# Patient Record
Sex: MALE | Race: WHITE | URBAN - NONMETROPOLITAN AREA
[De-identification: names, ages, dates, MRNs, and addresses within clinical notes are randomized per-mention and may not be internally consistent; named-entity substitution may affect disease eponyms.]

---

## 2020-12-04 ENCOUNTER — TELEPHONE ENCOUNTER (OUTPATIENT)
Dept: URBAN - NONMETROPOLITAN AREA CLINIC 4 | Facility: CLINIC | Age: 60
End: 2020-12-04

## 2020-12-04 RX ORDER — DICYCLOMINE HYDROCHLORIDE 20 MG/1
TAKE 1 TABLET BY MOUTH THREE TIMES DAILY AS NEEDED FOR 30 DAYS TABLET ORAL THREE TIMES A DAY
Qty: 90 | Refills: 2 | OUTPATIENT
Start: 2020-12-04 | End: 2021-08-31

## 2020-12-04 RX ORDER — ONDANSETRON HYDROCHLORIDE 4 MG/1
1 TABLET TABLET, FILM COATED ORAL ONCE A DAY
Qty: 30 | OUTPATIENT
Start: 2020-12-04

## 2020-12-07 ENCOUNTER — WEB ENCOUNTER (OUTPATIENT)
Dept: URBAN - METROPOLITAN AREA CLINIC 54 | Facility: CLINIC | Age: 60
End: 2020-12-07

## 2020-12-07 ENCOUNTER — LAB OUTSIDE AN ENCOUNTER (OUTPATIENT)
Dept: URBAN - METROPOLITAN AREA CLINIC 54 | Facility: CLINIC | Age: 60
End: 2020-12-07

## 2020-12-07 ENCOUNTER — OFFICE VISIT (OUTPATIENT)
Dept: URBAN - METROPOLITAN AREA CLINIC 54 | Facility: CLINIC | Age: 60
End: 2020-12-07
Payer: COMMERCIAL

## 2020-12-07 DIAGNOSIS — R19.4 CHANGE IN BOWEL HABITS: ICD-10-CM

## 2020-12-07 DIAGNOSIS — R10.30 LOWER ABDOMINAL PAIN: ICD-10-CM

## 2020-12-07 DIAGNOSIS — K57.92 DIVERTICULITIS: ICD-10-CM

## 2020-12-07 PROCEDURE — 99204 OFFICE O/P NEW MOD 45 MIN: CPT | Performed by: INTERNAL MEDICINE

## 2020-12-07 PROCEDURE — G8483 FLU IMM NO ADMIN DOC REA: HCPCS | Performed by: INTERNAL MEDICINE

## 2020-12-07 RX ORDER — METRONIDAZOLE 500 MG/1
1 TABLET TABLET ORAL
Qty: 30 | OUTPATIENT
Start: 2020-12-07 | End: 2020-12-17

## 2020-12-07 RX ORDER — ONDANSETRON HYDROCHLORIDE 4 MG/1
1 TABLET TABLET, FILM COATED ORAL ONCE A DAY
Qty: 30 | Status: DISCONTINUED | COMMUNITY
Start: 2020-12-04

## 2020-12-07 RX ORDER — DICYCLOMINE HYDROCHLORIDE 20 MG/1
TAKE 1 TABLET BY MOUTH THREE TIMES DAILY AS NEEDED FOR 30 DAYS TABLET ORAL THREE TIMES A DAY
Qty: 90 | Refills: 2 | Status: DISCONTINUED | COMMUNITY
Start: 2020-12-04 | End: 2021-08-31

## 2020-12-07 RX ORDER — NAPROXEN SODIUM 220 MG/1
1 TABLET WITH FOOD OR MILK AS NEEDED TABLET ORAL
Status: ACTIVE | COMMUNITY

## 2020-12-07 RX ORDER — CIPROFLOXACIN HYDROCHLORIDE 500 MG/1
1 TABLET TABLET, FILM COATED ORAL TWICE A DAY
Qty: 20 | OUTPATIENT
Start: 2020-12-07 | End: 2020-12-17

## 2020-12-07 NOTE — HPI-TODAY'S VISIT:
Having lower abd pain. No stool x 4 days.  Pt reports having lower abd pain but has gotten more intense. Pt reports that he had to go home from work on Thurs. Pt was able to keep the pain under control.  Pt has never had a dx of diverticuliti. Pt with lower left abd pain. Pain was intermittent and varying in intensity.

## 2020-12-09 ENCOUNTER — TELEPHONE ENCOUNTER (OUTPATIENT)
Dept: URBAN - METROPOLITAN AREA CLINIC 54 | Facility: CLINIC | Age: 60
End: 2020-12-09

## 2020-12-09 ENCOUNTER — TELEPHONE ENCOUNTER (OUTPATIENT)
Dept: URBAN - METROPOLITAN AREA CLINIC 92 | Facility: CLINIC | Age: 60
End: 2020-12-09

## 2020-12-10 ENCOUNTER — OUT OF OFFICE VISIT (OUTPATIENT)
Dept: URBAN - METROPOLITAN AREA MEDICAL CENTER 23 | Facility: MEDICAL CENTER | Age: 60
End: 2020-12-10
Payer: COMMERCIAL

## 2020-12-10 DIAGNOSIS — R53.83 DECREASED ENERGY: ICD-10-CM

## 2020-12-10 DIAGNOSIS — R10.84 ABDOMINAL CRAMPING, GENERALIZED: ICD-10-CM

## 2020-12-10 DIAGNOSIS — K57.20 ABSCESS OF SIGMOID COLON DUE TO DIVERTICULITIS: ICD-10-CM

## 2020-12-10 LAB
A/G RATIO: 0.8
ALBUMIN: 3.3
ALKALINE PHOSPHATASE: 137
ALT (SGPT): 30
APPEARANCE: (no result)
AST (SGOT): 30
BACTERIA: (no result)
BASO (ABSOLUTE): 0.1
BASOS: 0
BILIRUBIN, TOTAL: 0.5
BILIRUBIN: NEGATIVE
BUN/CREATININE RATIO: 8
BUN: 9
CALCIUM: 9.4
CARBON DIOXIDE, TOTAL: 25
CAST TYPE: (no result)
CASTS: PRESENT
CHLORIDE: 93
COMMENT: (no result)
CREATININE: 1.15
CRYSTAL TYPE: (no result)
CRYSTALS: (no result)
EGFR IF AFRICN AM: 80
EGFR IF NONAFRICN AM: 69
EOS (ABSOLUTE): 0
EOS: 0
EPITHELIAL CELLS (NON RENAL): (no result)
EPITHELIAL CELLS (RENAL): (no result)
GLOBULIN, TOTAL: 4.1
GLUCOSE: (no result)
GLUCOSE: 133
HEMATOCRIT: 52.3
HEMATOLOGY COMMENTS:: (no result)
HEMOGLOBIN: 18.7
IMMATURE CELLS: (no result)
IMMATURE GRANS (ABS): 0.4
IMMATURE GRANULOCYTES: 2
KETONES: (no result)
LYMPHS (ABSOLUTE): 1.7
LYMPHS: 9
Lab: (no result)
MCH: 35
MCHC: 35.8
MCV: 98
MICROSCOPIC EXAMINATION: (no result)
MICROSCOPIC EXAMINATION: (no result)
MONOCYTES(ABSOLUTE): 2.7
MONOCYTES: 14
MUCUS THREADS: PRESENT
NEUTROPHILS (ABSOLUTE): 14.5
NEUTROPHILS: 75
NITRITE, URINE: POSITIVE
NRBC: (no result)
OCCULT BLOOD: (no result)
PH: 5.5
PLATELETS: 405
POTASSIUM: 3.8
PROTEIN, TOTAL: 7.4
PROTEIN: (no result)
RBC: (no result)
RBC: 5.34
RDW: 12.2
SODIUM: 137
SPECIFIC GRAVITY: >=1.03
TRICHOMONAS: (no result)
URINALYSIS REFLEX: (no result)
URINE CULTURE, ROUTINE: (no result)
URINE-COLOR: YELLOW
UROBILINOGEN,SEMI-QN: 1
WBC ESTERASE: (no result)
WBC: (no result)
WBC: 19.3
YEAST: (no result)

## 2020-12-10 PROCEDURE — G8427 DOCREV CUR MEDS BY ELIG CLIN: HCPCS | Performed by: INTERNAL MEDICINE

## 2020-12-10 PROCEDURE — 99223 1ST HOSP IP/OBS HIGH 75: CPT | Performed by: INTERNAL MEDICINE

## 2020-12-14 ENCOUNTER — LAB OUTSIDE AN ENCOUNTER (OUTPATIENT)
Dept: URBAN - METROPOLITAN AREA CLINIC 54 | Facility: CLINIC | Age: 60
End: 2020-12-14

## 2020-12-30 ENCOUNTER — TELEPHONE ENCOUNTER (OUTPATIENT)
Dept: URBAN - METROPOLITAN AREA CLINIC 54 | Facility: CLINIC | Age: 60
End: 2020-12-30

## 2020-12-30 RX ORDER — SODIUM, POTASSIUM,MAG SULFATES 17.5-3.13G
354 ML SOLUTION, RECONSTITUTED, ORAL ORAL
Qty: 354 ML | Refills: 0 | OUTPATIENT
Start: 2020-12-30 | End: 2020-12-31

## 2020-12-30 RX ORDER — NAPROXEN SODIUM 220 MG/1
1 TABLET WITH FOOD OR MILK AS NEEDED TABLET ORAL
Status: ACTIVE | COMMUNITY

## 2021-01-08 ENCOUNTER — OFFICE VISIT (OUTPATIENT)
Dept: URBAN - METROPOLITAN AREA CLINIC 54 | Facility: CLINIC | Age: 61
End: 2021-01-08
Payer: COMMERCIAL

## 2021-01-08 ENCOUNTER — DASHBOARD ENCOUNTERS (OUTPATIENT)
Age: 61
End: 2021-01-08

## 2021-01-08 VITALS
HEART RATE: 111 BPM | WEIGHT: 182.2 LBS | DIASTOLIC BLOOD PRESSURE: 82 MMHG | TEMPERATURE: 97.8 F | HEIGHT: 70 IN | BODY MASS INDEX: 26.08 KG/M2 | SYSTOLIC BLOOD PRESSURE: 124 MMHG

## 2021-01-08 DIAGNOSIS — R10.30 LOWER ABDOMINAL PAIN: ICD-10-CM

## 2021-01-08 DIAGNOSIS — K57.92 DIVERTICULITIS: ICD-10-CM

## 2021-01-08 PROCEDURE — 99213 OFFICE O/P EST LOW 20 MIN: CPT | Performed by: INTERNAL MEDICINE

## 2021-01-08 RX ORDER — NAPROXEN SODIUM 220 MG/1
1 TABLET WITH FOOD OR MILK AS NEEDED TABLET ORAL
Status: ACTIVE | COMMUNITY

## 2021-01-08 NOTE — PHYSICAL EXAM GASTROINTESTINAL
Abdomen , soft, llq tender, nondistended , no guarding or rigidity , no masses palpable , normal bowel sounds , Liver and Spleen , no hepatomegaly present , no hepatosplenomegaly , liver nontender , spleen not palpable

## 2021-01-08 NOTE — HPI-TODAY'S VISIT:
Pateint returns with recent complicated diverticulitis for f/u. Pt s/p drainage with surgery for diverticulitis with contained perf/abscess. Being followed by surgery outpt. s/p abx therapy. Pt has not yet had C scope. Discussed deferring x 6-8 weeks. Low threshold for reimaging. Will need elective sigmoid resection. Still has some twinges and discomfort.

## 2021-02-10 ENCOUNTER — OFFICE VISIT (OUTPATIENT)
Dept: URBAN - METROPOLITAN AREA SURGERY CENTER 14 | Facility: SURGERY CENTER | Age: 61
End: 2021-02-10

## 2021-03-28 PROBLEM — 307496006 DIVERTICULITIS: Status: ACTIVE | Noted: 2020-12-07
